# Patient Record
Sex: MALE | NOT HISPANIC OR LATINO | Employment: OTHER | URBAN - METROPOLITAN AREA
[De-identification: names, ages, dates, MRNs, and addresses within clinical notes are randomized per-mention and may not be internally consistent; named-entity substitution may affect disease eponyms.]

---

## 2024-01-15 ENCOUNTER — HOSPITAL ENCOUNTER (OUTPATIENT)
Dept: RADIOLOGY | Facility: HOSPITAL | Age: 49
Discharge: HOME | End: 2024-01-15
Payer: MEDICARE

## 2024-01-15 DIAGNOSIS — M25.519 PAIN IN UNSPECIFIED SHOULDER: ICD-10-CM

## 2024-01-15 PROCEDURE — 73030 X-RAY EXAM OF SHOULDER: CPT | Mod: RT

## 2024-01-15 PROCEDURE — 73030 X-RAY EXAM OF SHOULDER: CPT | Mod: RIGHT SIDE | Performed by: RADIOLOGY

## 2024-01-17 ENCOUNTER — EVALUATION (OUTPATIENT)
Dept: PHYSICAL THERAPY | Facility: HOSPITAL | Age: 49
End: 2024-01-17
Payer: MEDICARE

## 2024-01-17 DIAGNOSIS — M54.9 DORSALGIA, UNSPECIFIED: ICD-10-CM

## 2024-01-17 PROCEDURE — 97162 PT EVAL MOD COMPLEX 30 MIN: CPT | Mod: GP

## 2024-01-17 PROCEDURE — 97140 MANUAL THERAPY 1/> REGIONS: CPT | Mod: GP

## 2024-01-17 ASSESSMENT — PATIENT HEALTH QUESTIONNAIRE - PHQ9
SUM OF ALL RESPONSES TO PHQ9 QUESTIONS 1 AND 2: 0
1. LITTLE INTEREST OR PLEASURE IN DOING THINGS: NOT AT ALL
2. FEELING DOWN, DEPRESSED OR HOPELESS: NOT AT ALL

## 2024-01-17 ASSESSMENT — ENCOUNTER SYMPTOMS
LOSS OF SENSATION IN FEET: 0
DEPRESSION: 0
OCCASIONAL FEELINGS OF UNSTEADINESS: 0

## 2024-01-17 NOTE — PROGRESS NOTES
Physical Therapy    Physical Therapy Evaluation and Treatment      Patient Name: Bobby Silverio  MRN: [id number]  Today's Date: 01/17/2024                  Current Problem:   1. Dorsalgia, unspecified  Referral to Physical Therapy    Follow Up In Physical Therapy          Subjective    General:  General  Reason for Referral: Thoracic Pain  Referred By: Dr Lang  Past Medical History Relevant to Rehab: Klippel-Feil Syndrome, Sprengels Syndrome, Ankylosing Spondylitis, Spina Bifida Occulta, Severe Asthma  Preferred Learning Style: kinesthetic, visual  Pt is a 49 yo male that reports having significant pain in the right scapular-thoracic area that radiates around to the right anterior chest, gets intermittent N/T into right upper extremity.  Pt states that he has had this same pain several times in the past, last time it lasted about 14 months until his dtr walked on his back causing it to pop.  Pt states that the pain is to much to let go for that long.  Pt has multiple congenital syndromes/disorders, does therapy daily to maintain current functional status and prevent progression of pain and deformity  Precautions:  Precautions  STEADI Fall Risk Score (The score of 4 or more indicates an increased risk of falling): 2  Pain:  Pain Assessment  Pain Assessment: 0-10  Pain Type: Chronic pain  Pain Location:  (Scapula- Thoracic)  Pain Orientation: Right  Pain Radiating Towards: Anterior Thoracic  Pain Frequency: Constant/continuous       Prior Level of Function:     Pt is independent with ADL's and ambulation without Assistive Device, on disability     Objective   Sensation:  Intact and symmetrical to light touch in UE's    Observation:  Right scapula elevated (Sprengels Deformity), scoliosis    Palpation:  Increased tone and tenderness in right rhombiod minor and levator,  Taut right thoracic paraspinals    Strength:  UE's WFL's    ROM:    Cervical  Right Left   Flexion 20     Ext 38     SB  10 16   Rot  46 44    Shoulder      Flexion  144 136     Outcome Measures:  Other Measures  Disability of Arm Shoulder Hand (DASH): 64%     Treatments:                                         Date: 01/17                                              VISIT# #1 # # #    EXERCISES REPS REPS REPS REPS            Pulley's:          Flexion                T-Band:                                                                                                                                                Manual:         Thoracic Mobs X        MFR/ STM X               Modalities:         US                        HEP  x        [x] Pt Educated on HEP   [] HEP printout issued    EDUCATION:  Outpatient Education  Individual(s) Educated: Patient  Education Provided: Home Exercise Program, Physiology, POC, Anatomy  Risk and Benefits Discussed with Patient/Caregiver/Other: yes  Patient/Caregiver Demonstrated Understanding: yes  Plan of Care Discussed and Agreed Upon: yes  Patient Response to Education: Patient/Caregiver Verbalized Understanding of Information, Patient/Caregiver Performed Return Demonstration of Exercises/Activities, Patient/Caregiver Asked Appropriate Questions    Assessment:  PT Assessment  PT Assessment Results: Decreased strength, Decreased range of motion, Pain  Rehab Prognosis: Good    Plan:  OP PT Plan  Treatment/Interventions: Cryotherapy, Education/ Instruction, Electrical stimulation, Manual therapy, Therapeutic exercises, Therapeutic activities, Ultrasound  PT Plan: Skilled PT  PT Frequency:  (1-2 x / wk)  Duration: 4 weeks  Onset Date: 12/01/23  Rehab Potential: Good  Plan of Care Agreement: Patient    Goals:  Active       Scapular Thoracic Pain       PT Goal 1       Start:  01/18/24    Expected End:  02/01/24       Pt will demonstrate independence with HEP to reinforce gains made in treatment          PT Goal 2       Start:  01/18/24    Expected End:  02/01/24       Pt will report having a decrease in pain to 4/10 at its  worst for decreased sleep disruption         PT Goal 3       Start:  01/18/24    Expected End:  02/15/24       Pt will report elimination of right UE paresthesia's and radicular pain into chest on right.

## 2024-01-18 ENCOUNTER — APPOINTMENT (OUTPATIENT)
Dept: PHYSICAL THERAPY | Facility: HOSPITAL | Age: 49
End: 2024-01-18
Payer: MEDICARE

## 2024-01-18 PROBLEM — Q74.0 SPRENGEL'S DEFORMITY: Status: ACTIVE | Noted: 2024-01-18

## 2024-01-18 PROBLEM — Q76.1 KLIPPEL-FEIL SYNDROME: Status: ACTIVE | Noted: 2024-01-18

## 2024-01-18 PROBLEM — M41.9 SCOLIOSIS: Status: ACTIVE | Noted: 2024-01-18

## 2024-01-18 PROBLEM — J45.50 SEVERE PERSISTENT ASTHMA WITHOUT COMPLICATION (MULTI): Status: ACTIVE | Noted: 2024-01-18

## 2024-01-18 PROBLEM — M45.9 ANKYLOSING SPONDYLITIS (MULTI): Status: ACTIVE | Noted: 2024-01-18

## 2024-01-18 ASSESSMENT — PAIN - FUNCTIONAL ASSESSMENT: PAIN_FUNCTIONAL_ASSESSMENT: 0-10

## 2024-01-23 ENCOUNTER — TREATMENT (OUTPATIENT)
Dept: PHYSICAL THERAPY | Facility: HOSPITAL | Age: 49
End: 2024-01-23
Payer: MEDICARE

## 2024-01-23 DIAGNOSIS — M54.9 DORSALGIA, UNSPECIFIED: ICD-10-CM

## 2024-01-23 PROCEDURE — 97035 APP MDLTY 1+ULTRASOUND EA 15: CPT | Mod: GP

## 2024-01-23 PROCEDURE — 97140 MANUAL THERAPY 1/> REGIONS: CPT | Mod: GP

## 2024-01-23 NOTE — PROGRESS NOTES
Physical Therapy    Physical Therapy Treatment    Patient Name: Bobby Silverio  MRN: [id number]  : 1975   Today's Date: 2024  Time Calculation  Start Time: 215  Stop Time: 030  Time Calculation (min): 45 min  Visit #2  Insurance:          Current Problem  1. Dorsalgia, unspecified  Follow Up In Physical Therapy            Subjective   Pt reports no new complaints, no changes in symptom. Pt reports that the only way he can sleep is with something underneath him that puts him into thoracic extension.       Precautions  Precautions  STEADI Fall Risk Score (The score of 4 or more indicates an increased risk of falling): 2       Pain  Pain Assessment: 0-10  Pain Score: 7  Pain Type: Chronic pain  Pain Orientation: Right  Pain Frequency: Constant/continuous    Objective      Pt instructed on A/P of thoracic region, instructed on positioning techniques for scapular retraction and  avoiding scapular protraction due to demonstration of various stretches and postioning post treatment in attempt to stretch thoracic region.  Review of xray reports    Treatments:                                         Date:                                              VISIT# #1 #2 # #    EXERCISES REPS REPS REPS REPS            Pulley's:          Flexion                T-Band:                                                                                                                                                Manual:         Thoracic Mobs X x       MFR/ STM, MET's X x              Modalities:         US-   1.4 w/cm2 1.0MHz x 8'                      HEP  x         Assessment:  Pt responded to treatment with mild decrease in pain initially until he began self stretching techniques.  Pt appears to have a good understanding of education provided.  TE's not performed this visit due to focus on decreasing pain and  pts home regiment.       Plan:     Progress with pain management             Goals:  Active       Scapular  Thoracic Pain       PT Goal 1       Start:  01/18/24    Expected End:  02/01/24       Pt will demonstrate independence with HEP to reinforce gains made in treatment          PT Goal 2       Start:  01/18/24    Expected End:  02/01/24       Pt will report having a decrease in pain to 4/10 at its worst for decreased sleep disruption         PT Goal 3       Start:  01/18/24    Expected End:  02/15/24       Pt will report elimination of right UE paresthesia's and radicular pain into chest on right.

## 2024-01-28 ASSESSMENT — PAIN SCALES - GENERAL: PAINLEVEL_OUTOF10: 7

## 2024-01-28 ASSESSMENT — PAIN - FUNCTIONAL ASSESSMENT: PAIN_FUNCTIONAL_ASSESSMENT: 0-10

## 2024-01-29 ENCOUNTER — APPOINTMENT (OUTPATIENT)
Dept: PHYSICAL THERAPY | Facility: HOSPITAL | Age: 49
End: 2024-01-29
Payer: MEDICARE

## 2024-01-29 ENCOUNTER — DOCUMENTATION (OUTPATIENT)
Dept: PHYSICAL THERAPY | Facility: HOSPITAL | Age: 49
End: 2024-01-29
Payer: MEDICARE

## 2024-01-29 NOTE — PROGRESS NOTES
Physical Therapy    Discharge Summary    Name: Bobby Silverio  MRN: [id number]  : 1975  Date: 24    Discharge Summary: PT    Discharge Information: Date of discharge 24, Date of last visit 24, Date of evaluation 24, and Number of attended visits 2        Rehab Discharge Reason: Patient requested due to no change in symptoms with eval and 1 treatment

## 2024-01-29 NOTE — PROGRESS NOTES
Physical Therapy                 Therapy Communication Note    Patient Name: Bobby Silverio  MRN: [id number]  Today's Date: 1/29/2024     Discipline: Physical Therapy    Missed Visit Reason:      Missed Time: No Show    Comment:Pt. Did not show for today's appt. I did attempt to leave a voicemail for the pt. Today but his voicemail was full

## 2024-02-02 ENCOUNTER — APPOINTMENT (OUTPATIENT)
Dept: PHYSICAL THERAPY | Facility: HOSPITAL | Age: 49
End: 2024-02-02
Payer: MEDICARE

## 2024-02-05 ENCOUNTER — APPOINTMENT (OUTPATIENT)
Dept: PHYSICAL THERAPY | Facility: HOSPITAL | Age: 49
End: 2024-02-05
Payer: MEDICARE

## 2024-02-08 ENCOUNTER — APPOINTMENT (OUTPATIENT)
Dept: PHYSICAL THERAPY | Facility: HOSPITAL | Age: 49
End: 2024-02-08
Payer: MEDICARE

## 2024-02-12 ENCOUNTER — APPOINTMENT (OUTPATIENT)
Dept: PHYSICAL THERAPY | Facility: HOSPITAL | Age: 49
End: 2024-02-12
Payer: MEDICARE

## 2024-02-15 ENCOUNTER — APPOINTMENT (OUTPATIENT)
Dept: PHYSICAL THERAPY | Facility: HOSPITAL | Age: 49
End: 2024-02-15
Payer: MEDICARE

## 2024-02-19 ENCOUNTER — HOSPITAL ENCOUNTER (OUTPATIENT)
Dept: RADIOLOGY | Facility: HOSPITAL | Age: 49
Discharge: HOME | End: 2024-02-19
Payer: MEDICARE

## 2024-02-19 DIAGNOSIS — Q67.8 OTHER CONGENITAL DEFORMITIES OF CHEST: ICD-10-CM

## 2024-02-19 PROCEDURE — 71250 CT THORAX DX C-: CPT
